# Patient Record
Sex: MALE | Race: WHITE
[De-identification: names, ages, dates, MRNs, and addresses within clinical notes are randomized per-mention and may not be internally consistent; named-entity substitution may affect disease eponyms.]

---

## 2021-09-27 ENCOUNTER — HOSPITAL ENCOUNTER (OUTPATIENT)
Dept: HOSPITAL 43 - DL.ENDO | Age: 66
Discharge: HOME | End: 2021-09-27
Attending: INTERNAL MEDICINE
Payer: MEDICARE

## 2021-09-27 VITALS — SYSTOLIC BLOOD PRESSURE: 122 MMHG | HEART RATE: 61 BPM | DIASTOLIC BLOOD PRESSURE: 74 MMHG

## 2021-09-27 DIAGNOSIS — N18.9: ICD-10-CM

## 2021-09-27 DIAGNOSIS — I12.9: ICD-10-CM

## 2021-09-27 DIAGNOSIS — Z98.890: ICD-10-CM

## 2021-09-27 DIAGNOSIS — K21.9: ICD-10-CM

## 2021-09-27 DIAGNOSIS — R19.7: ICD-10-CM

## 2021-09-27 DIAGNOSIS — Z88.8: ICD-10-CM

## 2021-09-27 DIAGNOSIS — G47.30: ICD-10-CM

## 2021-09-27 DIAGNOSIS — K29.80: Primary | ICD-10-CM

## 2021-09-27 DIAGNOSIS — K44.9: ICD-10-CM

## 2021-09-27 DIAGNOSIS — F17.210: ICD-10-CM

## 2021-09-27 DIAGNOSIS — Z86.73: ICD-10-CM

## 2021-09-27 DIAGNOSIS — K26.9: ICD-10-CM

## 2021-09-27 DIAGNOSIS — N40.0: ICD-10-CM

## 2021-09-27 DIAGNOSIS — Z88.5: ICD-10-CM

## 2021-09-27 DIAGNOSIS — Z90.49: ICD-10-CM

## 2021-09-27 DIAGNOSIS — K31.89: ICD-10-CM

## 2021-09-27 PROCEDURE — 43239 EGD BIOPSY SINGLE/MULTIPLE: CPT

## 2021-09-27 PROCEDURE — 87077 CULTURE AEROBIC IDENTIFY: CPT

## 2021-09-27 NOTE — OR
DATE:  09/27/2021

 

PROCEDURES:  Esophagogastroduodenoscopy and multiple pinch biopsies.

 

INSTRUMENT USED:  GIF- Olympus video panendoscope.

 

PREMEDICATIONS:  No oral or topical anesthesia used.  Fentanyl 100 mcg

intravenous, Versed 2 mg intravenous.  Nasal O2 cannula.

 

The procedure was done under pulse oximetry, BP recording, and cardiac

monitoring.

 

INDICATIONS:  The patient with longstanding persistent heartburn as well as

diarrhea unexplained and not responsive to medical measures.

Esophagogastroduodenoscopy is performed for detection of any active erosive

lesions, Powers's esophagus and/or malignancy also under consideration, H.

pylori status to be determined, small bowel biopsies to be obtained for celiac

disease, and endoscopic hemostasis therapy if needed.

 

PROCEDURE IN DETAIL:  The scope was passed with ease.  Adequate visualization of

the esophagus was made from proximal to distal areas.  No upper esophageal

lesions identified.  No distal esophageal stricture.  No uphill or downhill

esophageal varices.  No Zunilda-Verma tear.  No evidence of erosive esophagitis

by Love criteria.  No esophageal polyp or tumor mass identified.  Z-line

was seen at around 40 cm distal to the oral verge.  Grade A erosive changes were

noted by Love criteria.  Small hiatal hernia was noted.  No proximal

gastric varices identified.  Gastric fundus examination by retroflexion showed

no polypoid lesions.  No gastric ulcer, malignant mass, or vascular ectasia

identified.  Some mild patchy erythema of the antrum was noted.  Duodenal bulb

showed erosions without bleeding from it.  Visualized second part of the

duodenum was unremarkable.  Multiple pinch biopsies, 4 in number, were taken

from different areas of the second part of the duodenum and tissues were also

obtained from the duodenal bulb at 9 and 12 o'clock positions and sent for any

histopathologic evidence of celiac disease.  Multiple pinch biopsies were also

taken from the gastric antrum and proximal body and sent for PyloriTek test for

H. pylori and histopathology.  No bleeding was noted from any of the visualized

areas at the completion of examination.  Photographs were taken of the duodenal

bulb, gastric antrum, fundus, and distal esophagus.

 

IMPRESSION:

1. Grade A gastroesophageal reflux disease.

2. Sliding hiatal hernia.

3. Duodenal bulb erosion.

The patient tolerated the procedure well.

 

DD:  09/27/2021 09:09:05

DT:  09/27/2021 15:31:40

[facility]

Job #:  [id number]

[facility]

## 2021-09-28 ENCOUNTER — HOSPITAL ENCOUNTER (OUTPATIENT)
Dept: HOSPITAL 43 - DL.ENDO | Age: 66
Discharge: HOME | End: 2021-09-28
Attending: INTERNAL MEDICINE
Payer: MEDICARE

## 2021-09-28 VITALS — HEART RATE: 62 BPM | SYSTOLIC BLOOD PRESSURE: 140 MMHG | DIASTOLIC BLOOD PRESSURE: 66 MMHG

## 2021-09-28 DIAGNOSIS — G47.30: ICD-10-CM

## 2021-09-28 DIAGNOSIS — Z88.8: ICD-10-CM

## 2021-09-28 DIAGNOSIS — Z99.2: ICD-10-CM

## 2021-09-28 DIAGNOSIS — K64.8: ICD-10-CM

## 2021-09-28 DIAGNOSIS — Z90.49: ICD-10-CM

## 2021-09-28 DIAGNOSIS — I12.9: ICD-10-CM

## 2021-09-28 DIAGNOSIS — F17.210: ICD-10-CM

## 2021-09-28 DIAGNOSIS — Z86.73: ICD-10-CM

## 2021-09-28 DIAGNOSIS — Z98.890: ICD-10-CM

## 2021-09-28 DIAGNOSIS — Q61.3: ICD-10-CM

## 2021-09-28 DIAGNOSIS — K57.30: Primary | ICD-10-CM

## 2021-09-28 DIAGNOSIS — N18.9: ICD-10-CM

## 2021-09-28 DIAGNOSIS — N40.0: ICD-10-CM

## 2021-09-28 DIAGNOSIS — Z88.5: ICD-10-CM

## 2022-05-24 NOTE — OR
DATE:  09/28/2021

 

PROCEDURE:  Total colonoscopy and multiple pinch biopsies.

 

INSTRUMENT USED:  PCF-H190DL Olympus video colonoscope.

 

PREMEDICATIONS:  Fentanyl 100 mcg intravenous, Versed 4 mg intravenous.  Nasal

O2 cannula.

 

The procedure was done under pulse oximetry, BP recording, and cardiac monitor.

 

INDICATION:  Patient with persistent diarrhea, unexplained and not responsive to

medical measures.  Colonoscopic examination is done for detection of any

polypoid lesions and removal, biopsies to be obtained for microscopic colitis,

endoscopic hemostasis therapy if needed.

 

DESCRIPTION OF PROCEDURE:  Initial rectal exam was unremarkable.  Rigid anoscopy

showed small internal hemorrhoids without bleeding from them.  The colonoscope

was passed with ease.  Numerous scattered diverticula were noted in the distal

left colon along with deformity.  There was large amount of dark fecal material

that had to be aspirated.  The scope was passed with ease up to the ileocecal

area.  Photographs were taken of the normal-appearing cecum, identified by

landmarks of appendiceal orifice and thin-lipped ileocecal folds, that prevented

further advancement of the instrument to visualize the terminal ileum.  No

bleeding was noted from any of the visualized areas at the commencement of the

examination.  The bowel preparation was found to be adequate, Seward scale 2 in

the right and left colon, and 3 in transverse colon, total score 7.  No

stricture.  No vascular ectasia.  No large isolated ulcerations seen.  No

evidence of diffuse inflammatory bowel disease in the form of friability,

contact bleeding, or ulcerations.  No polyp or tumor mass identified.  Probing

the proximal sides of folds and flexures using adequate distention and clearing

up the stool material, withdrawal of the scope was made.  Multiple pinch

biopsies were taken from the normal-appearing mucosa of the mid transverse

colon, mid descending colon, and rectosigmoid and sent for any histopathologic

evidence of microscopic colitis.  No bleeding was noted from any of the

visualized areas at the completion of the examination.

 

IMPRESSION:

1. Internal hemorrhoids.

2. Diverticulosis.

 

The patient tolerated the procedure well.

 

DD:  09/28/2021 07:38:45

DT:  09/28/2021 11:40:54

[facility]

Job #:  [id number]